# Patient Record
Sex: FEMALE | Race: WHITE | URBAN - METROPOLITAN AREA
[De-identification: names, ages, dates, MRNs, and addresses within clinical notes are randomized per-mention and may not be internally consistent; named-entity substitution may affect disease eponyms.]

---

## 2019-02-05 ENCOUNTER — OFFICE VISIT (OUTPATIENT)
Dept: URBAN - METROPOLITAN AREA CLINIC 16 | Facility: CLINIC | Age: 74
End: 2019-02-05
Payer: COMMERCIAL

## 2019-02-05 DIAGNOSIS — H25.13 AGE-RELATED NUCLEAR CATARACT, BILATERAL: ICD-10-CM

## 2019-02-05 DIAGNOSIS — L25.9 CONTACT DERMATITIS: Primary | ICD-10-CM

## 2019-02-05 PROCEDURE — 92002 INTRM OPH EXAM NEW PATIENT: CPT | Performed by: OPTOMETRIST

## 2019-02-05 RX ORDER — TOBRAMYCIN AND DEXAMETHASONE 3; 1 MG/G; MG/G
OINTMENT OPHTHALMIC
Qty: 1 | Refills: 0 | Status: INACTIVE
Start: 2019-02-05 | End: 2019-02-12

## 2019-02-05 ASSESSMENT — INTRAOCULAR PRESSURE
OD: 15
OS: 16

## 2019-02-05 NOTE — IMPRESSION/PLAN
Impression: Contact dermatitis: L25.9. OU. Plan: Discussed diagnosis in detail with patient. New medication(s) Rx given today. Discussed changing to hypoallergenic products. See allergist if symptoms return.